# Patient Record
Sex: FEMALE | Race: ASIAN | NOT HISPANIC OR LATINO
[De-identification: names, ages, dates, MRNs, and addresses within clinical notes are randomized per-mention and may not be internally consistent; named-entity substitution may affect disease eponyms.]

---

## 2017-02-23 ENCOUNTER — LABORATORY RESULT (OUTPATIENT)
Age: 62
End: 2017-02-23

## 2017-04-06 ENCOUNTER — APPOINTMENT (OUTPATIENT)
Dept: VASCULAR SURGERY | Facility: CLINIC | Age: 62
End: 2017-04-06

## 2017-04-06 VITALS — SYSTOLIC BLOOD PRESSURE: 111 MMHG | DIASTOLIC BLOOD PRESSURE: 74 MMHG | HEART RATE: 77 BPM

## 2017-04-06 VITALS
BODY MASS INDEX: 27.6 KG/M2 | TEMPERATURE: 98 F | SYSTOLIC BLOOD PRESSURE: 98 MMHG | HEART RATE: 76 BPM | DIASTOLIC BLOOD PRESSURE: 66 MMHG | HEIGHT: 62 IN | WEIGHT: 150 LBS

## 2017-05-19 ENCOUNTER — OUTPATIENT (OUTPATIENT)
Dept: OUTPATIENT SERVICES | Facility: HOSPITAL | Age: 62
LOS: 1 days | End: 2017-05-19

## 2017-05-19 VITALS
DIASTOLIC BLOOD PRESSURE: 70 MMHG | HEIGHT: 61 IN | SYSTOLIC BLOOD PRESSURE: 104 MMHG | RESPIRATION RATE: 16 BRPM | WEIGHT: 149.91 LBS | TEMPERATURE: 98 F | HEART RATE: 76 BPM

## 2017-05-19 DIAGNOSIS — I10 ESSENTIAL (PRIMARY) HYPERTENSION: ICD-10-CM

## 2017-05-19 DIAGNOSIS — E03.9 HYPOTHYROIDISM, UNSPECIFIED: ICD-10-CM

## 2017-05-19 DIAGNOSIS — K76.0 FATTY (CHANGE OF) LIVER, NOT ELSEWHERE CLASSIFIED: ICD-10-CM

## 2017-05-19 DIAGNOSIS — Z98.891 HISTORY OF UTERINE SCAR FROM PREVIOUS SURGERY: Chronic | ICD-10-CM

## 2017-05-19 DIAGNOSIS — I72.8 ANEURYSM OF OTHER SPECIFIED ARTERIES: ICD-10-CM

## 2017-05-19 DIAGNOSIS — C34.90 MALIGNANT NEOPLASM OF UNSPECIFIED PART OF UNSPECIFIED BRONCHUS OR LUNG: Chronic | ICD-10-CM

## 2017-05-19 LAB
ALBUMIN SERPL ELPH-MCNC: 4.8 G/DL — SIGNIFICANT CHANGE UP (ref 3.3–5)
ALP SERPL-CCNC: 66 U/L — SIGNIFICANT CHANGE UP (ref 40–120)
ALT FLD-CCNC: 43 U/L — HIGH (ref 4–33)
APTT BLD: 30.7 SEC — SIGNIFICANT CHANGE UP (ref 27.5–37.4)
AST SERPL-CCNC: 21 U/L — SIGNIFICANT CHANGE UP (ref 4–32)
BILIRUB SERPL-MCNC: 0.5 MG/DL — SIGNIFICANT CHANGE UP (ref 0.2–1.2)
BLD GP AB SCN SERPL QL: NEGATIVE — SIGNIFICANT CHANGE UP
BUN SERPL-MCNC: 18 MG/DL — SIGNIFICANT CHANGE UP (ref 7–23)
CALCIUM SERPL-MCNC: 9.8 MG/DL — SIGNIFICANT CHANGE UP (ref 8.4–10.5)
CHLORIDE SERPL-SCNC: 101 MMOL/L — SIGNIFICANT CHANGE UP (ref 98–107)
CO2 SERPL-SCNC: 24 MMOL/L — SIGNIFICANT CHANGE UP (ref 22–31)
CREAT SERPL-MCNC: 0.76 MG/DL — SIGNIFICANT CHANGE UP (ref 0.5–1.3)
GLUCOSE SERPL-MCNC: 101 MG/DL — HIGH (ref 70–99)
HCT VFR BLD CALC: 38.8 % — SIGNIFICANT CHANGE UP (ref 34.5–45)
HGB BLD-MCNC: 13.2 G/DL — SIGNIFICANT CHANGE UP (ref 11.5–15.5)
INR BLD: 0.9 — SIGNIFICANT CHANGE UP (ref 0.88–1.17)
MCHC RBC-ENTMCNC: 31.4 PG — SIGNIFICANT CHANGE UP (ref 27–34)
MCHC RBC-ENTMCNC: 34 % — SIGNIFICANT CHANGE UP (ref 32–36)
MCV RBC AUTO: 92.4 FL — SIGNIFICANT CHANGE UP (ref 80–100)
PLATELET # BLD AUTO: 238 K/UL — SIGNIFICANT CHANGE UP (ref 150–400)
PMV BLD: 10.3 FL — SIGNIFICANT CHANGE UP (ref 7–13)
POTASSIUM SERPL-MCNC: 3.8 MMOL/L — SIGNIFICANT CHANGE UP (ref 3.5–5.3)
POTASSIUM SERPL-SCNC: 3.8 MMOL/L — SIGNIFICANT CHANGE UP (ref 3.5–5.3)
PROT SERPL-MCNC: 8.1 G/DL — SIGNIFICANT CHANGE UP (ref 6–8.3)
PROTHROM AB SERPL-ACNC: 10 SEC — SIGNIFICANT CHANGE UP (ref 9.8–13.1)
RBC # BLD: 4.2 M/UL — SIGNIFICANT CHANGE UP (ref 3.8–5.2)
RBC # FLD: 11.6 % — SIGNIFICANT CHANGE UP (ref 10.3–14.5)
RH IG SCN BLD-IMP: POSITIVE — SIGNIFICANT CHANGE UP
SODIUM SERPL-SCNC: 141 MMOL/L — SIGNIFICANT CHANGE UP (ref 135–145)
WBC # BLD: 10.35 K/UL — SIGNIFICANT CHANGE UP (ref 3.8–10.5)
WBC # FLD AUTO: 10.35 K/UL — SIGNIFICANT CHANGE UP (ref 3.8–10.5)

## 2017-05-19 RX ORDER — SODIUM CHLORIDE 9 MG/ML
1000 INJECTION, SOLUTION INTRAVENOUS
Qty: 0 | Refills: 0 | Status: DISCONTINUED | OUTPATIENT
Start: 2017-05-30 | End: 2017-05-31

## 2017-05-19 NOTE — H&P PST ADULT - PROBLEM SELECTOR PLAN 2
Patient to continue medication and to take in am of surgery  EKG in chart from PCP, medical clearance requested by surgeon, will obtain medical clearance

## 2017-05-19 NOTE — H&P PST ADULT - PMH
Adenocarcinoma of lung    Aneurysm of splenic artery    HLD (hyperlipidemia)    Hypertension    Hypothyroidism    Insomnia Adenocarcinoma of lung    Aneurysm of splenic artery    Fatty liver    HLD (hyperlipidemia)    Hypertension    Hypothyroidism    Insomnia

## 2017-05-19 NOTE — H&P PST ADULT - NSANTHOSAYNRD_GEN_A_CORE
No. JAVAD screening performed.  STOP BANG Legend: 0-2 = LOW Risk; 3-4 = INTERMEDIATE Risk; 5-8 = HIGH Risk

## 2017-05-19 NOTE — H&P PST ADULT - HISTORY OF PRESENT ILLNESS
62 yr old female with PMH Lung cancer s/p right lung wedge resection 62 yr old female with PMH Lung cancer s/p right lung resection right middle and right lower lobe h/o splenic artery aneurysm x 10 yrs patient stated the last CT scan shoed increase in size. Patient scheduled for splenic artery aneurysm angiogram and  embolization on 5/30/2017 62 yr old female with PMH Lung cancer s/p lung resection right middle and right lower lobe h/o splenic artery aneurysm x 10 yrs patient stated the last CT scan showed increase in size". Patient scheduled for splenic artery aneurysm angiogram and  embolization on 5/30/2017

## 2017-05-19 NOTE — H&P PST ADULT - NEGATIVE NEUROLOGICAL SYMPTOMS
no syncope/no paresthesias/no generalized seizures/no weakness/no transient paralysis/no focal seizures

## 2017-05-19 NOTE — H&P PST ADULT - FAMILY HISTORY
Father  Still living? No  Family history of liver cancer, Age at diagnosis: Age Unknown  Family history of stroke or transient ischemic attack in father, Age at diagnosis: Age Unknown

## 2017-05-19 NOTE — H&P PST ADULT - RS GEN PE MLT RESP DETAILS PC
airway patent/no wheezes/clear to auscultation bilaterally/no rhonchi/diminished breath sounds, R clear to auscultation bilaterally/no rhonchi/diminished breath sounds, R/no wheezes/airway patent/s/p lobectomy right

## 2017-05-19 NOTE — H&P PST ADULT - PROBLEM SELECTOR PLAN 1
62 yr old female scheduled for Splenic artery aneurysm angiogram and embolization on 5/30/2017  Pre op instruction given and patient verbalized understanding  3 Positive on STOP BANG questioner, JAVAD precaution recommended, OR booking notified

## 2017-05-30 ENCOUNTER — INPATIENT (INPATIENT)
Facility: HOSPITAL | Age: 62
LOS: 1 days | Discharge: HOME | End: 2017-06-01
Attending: SURGERY | Admitting: SURGERY
Payer: COMMERCIAL

## 2017-05-30 ENCOUNTER — APPOINTMENT (OUTPATIENT)
Dept: VASCULAR SURGERY | Facility: HOSPITAL | Age: 62
End: 2017-05-30

## 2017-05-30 VITALS
WEIGHT: 149.91 LBS | OXYGEN SATURATION: 98 % | HEIGHT: 61 IN | TEMPERATURE: 99 F | HEART RATE: 68 BPM | SYSTOLIC BLOOD PRESSURE: 108 MMHG | RESPIRATION RATE: 16 BRPM | DIASTOLIC BLOOD PRESSURE: 72 MMHG

## 2017-05-30 DIAGNOSIS — C34.90 MALIGNANT NEOPLASM OF UNSPECIFIED PART OF UNSPECIFIED BRONCHUS OR LUNG: Chronic | ICD-10-CM

## 2017-05-30 DIAGNOSIS — Z98.891 HISTORY OF UTERINE SCAR FROM PREVIOUS SURGERY: Chronic | ICD-10-CM

## 2017-05-30 DIAGNOSIS — I72.8 ANEURYSM OF OTHER SPECIFIED ARTERIES: ICD-10-CM

## 2017-05-30 LAB — RH IG SCN BLD-IMP: POSITIVE — SIGNIFICANT CHANGE UP

## 2017-05-30 PROCEDURE — 75625 CONTRAST EXAM ABDOMINL AORTA: CPT | Mod: 26,59

## 2017-05-30 PROCEDURE — 37242 VASC EMBOLIZE/OCCLUDE ARTERY: CPT

## 2017-05-30 RX ORDER — SIMVASTATIN 20 MG/1
20 TABLET, FILM COATED ORAL AT BEDTIME
Qty: 0 | Refills: 0 | Status: DISCONTINUED | OUTPATIENT
Start: 2017-05-30 | End: 2017-06-01

## 2017-05-30 RX ORDER — SULFACETAMIDE SODIUM 10 %
1 DROPS OPHTHALMIC (EYE)
Qty: 0 | Refills: 0 | COMMUNITY

## 2017-05-30 RX ORDER — ONDANSETRON 8 MG/1
4 TABLET, FILM COATED ORAL ONCE
Qty: 0 | Refills: 0 | Status: COMPLETED | OUTPATIENT
Start: 2017-05-30 | End: 2017-05-30

## 2017-05-30 RX ORDER — HEPARIN SODIUM 5000 [USP'U]/ML
5000 INJECTION INTRAVENOUS; SUBCUTANEOUS EVERY 8 HOURS
Qty: 0 | Refills: 0 | Status: DISCONTINUED | OUTPATIENT
Start: 2017-05-30 | End: 2017-06-01

## 2017-05-30 RX ORDER — HYDROMORPHONE HYDROCHLORIDE 2 MG/ML
1 INJECTION INTRAMUSCULAR; INTRAVENOUS; SUBCUTANEOUS EVERY 4 HOURS
Qty: 0 | Refills: 0 | Status: DISCONTINUED | OUTPATIENT
Start: 2017-05-30 | End: 2017-06-01

## 2017-05-30 RX ORDER — SIMVASTATIN 20 MG/1
1 TABLET, FILM COATED ORAL
Qty: 0 | Refills: 0 | COMMUNITY

## 2017-05-30 RX ORDER — ZOLPIDEM TARTRATE 10 MG/1
1 TABLET ORAL
Qty: 0 | Refills: 0 | COMMUNITY

## 2017-05-30 RX ORDER — HYDROMORPHONE HYDROCHLORIDE 2 MG/ML
0.25 INJECTION INTRAMUSCULAR; INTRAVENOUS; SUBCUTANEOUS
Qty: 0 | Refills: 0 | Status: DISCONTINUED | OUTPATIENT
Start: 2017-05-30 | End: 2017-05-30

## 2017-05-30 RX ORDER — AZELASTINE HCL 0.05 %
4 DROPS OPHTHALMIC (EYE)
Qty: 0 | Refills: 0 | COMMUNITY

## 2017-05-30 RX ORDER — OXYCODONE HYDROCHLORIDE 5 MG/1
10 TABLET ORAL EVERY 4 HOURS
Qty: 0 | Refills: 0 | Status: DISCONTINUED | OUTPATIENT
Start: 2017-05-30 | End: 2017-06-01

## 2017-05-30 RX ORDER — LEVOTHYROXINE SODIUM 125 MCG
1 TABLET ORAL
Qty: 0 | Refills: 0 | COMMUNITY

## 2017-05-30 RX ORDER — OXYCODONE HYDROCHLORIDE 5 MG/1
5 TABLET ORAL EVERY 4 HOURS
Qty: 0 | Refills: 0 | Status: DISCONTINUED | OUTPATIENT
Start: 2017-05-30 | End: 2017-06-01

## 2017-05-30 RX ORDER — HYDROMORPHONE HYDROCHLORIDE 2 MG/ML
0.5 INJECTION INTRAMUSCULAR; INTRAVENOUS; SUBCUTANEOUS
Qty: 0 | Refills: 0 | Status: DISCONTINUED | OUTPATIENT
Start: 2017-05-30 | End: 2017-05-30

## 2017-05-30 RX ORDER — LANOLIN ALCOHOL/MO/W.PET/CERES
3 CREAM (GRAM) TOPICAL AT BEDTIME
Qty: 0 | Refills: 0 | Status: DISCONTINUED | OUTPATIENT
Start: 2017-05-30 | End: 2017-06-01

## 2017-05-30 RX ADMIN — ONDANSETRON 4 MILLIGRAM(S): 8 TABLET, FILM COATED ORAL at 20:09

## 2017-05-30 RX ADMIN — ONDANSETRON 4 MILLIGRAM(S): 8 TABLET, FILM COATED ORAL at 22:20

## 2017-05-30 RX ADMIN — HYDROMORPHONE HYDROCHLORIDE 0.5 MILLIGRAM(S): 2 INJECTION INTRAMUSCULAR; INTRAVENOUS; SUBCUTANEOUS at 15:54

## 2017-05-30 RX ADMIN — HYDROMORPHONE HYDROCHLORIDE 0.5 MILLIGRAM(S): 2 INJECTION INTRAMUSCULAR; INTRAVENOUS; SUBCUTANEOUS at 16:05

## 2017-05-30 RX ADMIN — ONDANSETRON 4 MILLIGRAM(S): 8 TABLET, FILM COATED ORAL at 14:45

## 2017-05-30 RX ADMIN — HYDROMORPHONE HYDROCHLORIDE 0.25 MILLIGRAM(S): 2 INJECTION INTRAMUSCULAR; INTRAVENOUS; SUBCUTANEOUS at 15:43

## 2017-05-30 RX ADMIN — HYDROMORPHONE HYDROCHLORIDE 0.5 MILLIGRAM(S): 2 INJECTION INTRAMUSCULAR; INTRAVENOUS; SUBCUTANEOUS at 15:44

## 2017-05-30 RX ADMIN — OXYCODONE HYDROCHLORIDE 10 MILLIGRAM(S): 5 TABLET ORAL at 21:54

## 2017-05-30 RX ADMIN — HYDROMORPHONE HYDROCHLORIDE 0.5 MILLIGRAM(S): 2 INJECTION INTRAMUSCULAR; INTRAVENOUS; SUBCUTANEOUS at 17:22

## 2017-05-30 RX ADMIN — HYDROMORPHONE HYDROCHLORIDE 1 MILLIGRAM(S): 2 INJECTION INTRAMUSCULAR; INTRAVENOUS; SUBCUTANEOUS at 23:58

## 2017-05-30 RX ADMIN — HYDROMORPHONE HYDROCHLORIDE 0.25 MILLIGRAM(S): 2 INJECTION INTRAMUSCULAR; INTRAVENOUS; SUBCUTANEOUS at 15:00

## 2017-05-30 RX ADMIN — OXYCODONE HYDROCHLORIDE 10 MILLIGRAM(S): 5 TABLET ORAL at 21:08

## 2017-05-30 RX ADMIN — HYDROMORPHONE HYDROCHLORIDE 0.5 MILLIGRAM(S): 2 INJECTION INTRAMUSCULAR; INTRAVENOUS; SUBCUTANEOUS at 17:35

## 2017-05-30 RX ADMIN — Medication 3 MILLIGRAM(S): at 21:08

## 2017-05-30 RX ADMIN — SIMVASTATIN 20 MILLIGRAM(S): 20 TABLET, FILM COATED ORAL at 21:08

## 2017-05-30 RX ADMIN — SODIUM CHLORIDE 30 MILLILITER(S): 9 INJECTION, SOLUTION INTRAVENOUS at 15:28

## 2017-05-30 RX ADMIN — SODIUM CHLORIDE 30 MILLILITER(S): 9 INJECTION, SOLUTION INTRAVENOUS at 20:26

## 2017-05-30 RX ADMIN — HYDROMORPHONE HYDROCHLORIDE 0.25 MILLIGRAM(S): 2 INJECTION INTRAMUSCULAR; INTRAVENOUS; SUBCUTANEOUS at 14:45

## 2017-05-30 RX ADMIN — HYDROMORPHONE HYDROCHLORIDE 0.25 MILLIGRAM(S): 2 INJECTION INTRAMUSCULAR; INTRAVENOUS; SUBCUTANEOUS at 15:26

## 2017-05-30 RX ADMIN — HEPARIN SODIUM 5000 UNIT(S): 5000 INJECTION INTRAVENOUS; SUBCUTANEOUS at 21:08

## 2017-05-30 RX ADMIN — HYDROMORPHONE HYDROCHLORIDE 0.5 MILLIGRAM(S): 2 INJECTION INTRAMUSCULAR; INTRAVENOUS; SUBCUTANEOUS at 15:55

## 2017-05-30 NOTE — ASU PATIENT PROFILE, ADULT - PMH
Adenocarcinoma of lung    Aneurysm of splenic artery    Fatty liver    HLD (hyperlipidemia)    Hypertension    Hypothyroidism    Insomnia

## 2017-05-30 NOTE — CHART NOTE - NSCHARTNOTEFT_GEN_A_CORE
Vascular surgery     SUBJECTIVE: pain controlled, no nausea/vomiting/headache/chest pain/shortness of breath. pt c/o of dizziness. Pt was nauseated earlier, but now denies nausea after receiving a zofran dose.      OBJECTIVE:  PHYSICAL EXAM:  GA: NAD  Abdomen:  -  soft, non-distended, non-tender  - Right Groin: Dressing is clean/dry/intact     Vitals/Labs:  Vital Signs Last 24 Hrs  T(C): 36.7, Max: 37 (05-30 @ 10:46)  T(F): 98.1, Max: 98.6 (05-30 @ 10:46)  HR: 78 (68 - 82)  BP: 107/62 (107/62 - 135/74)  BP(mean): --  RR: 12 (12 - 25)  SpO2: 100% (98% - 100%)    ASSESSMENT/PLAN: SOON CHO 62y Female s/p  splenic artery embolization   - Diet: reg  - Pain control   - Input and outputs   - DVT ppx  - OOB/incentive spirometry four hours post-operatively   MEDICATIONS  (STANDING):  lactated ringers. 1000milliLiter(s) IV Continuous <Continuous>  enalapril 20milliGRAM(s) Oral daily  simvastatin 20milliGRAM(s) Oral at bedtime  heparin  Injectable 5000Unit(s) SubCutaneous every 8 hours    MEDICATIONS  (PRN):  HYDROmorphone  Injectable 0.25milliGRAM(s) IV Push every 10 minutes PRN Moderate Pain (4 - 6)  HYDROmorphone  Injectable 0.5milliGRAM(s) IV Push every 10 minutes PRN Severe Pain (7 - 10)    C team Surgery   Pager: [phone]   Michael Mathias M.D. (PGY-1)

## 2017-05-30 NOTE — ASU PREOP CHECKLIST - BLOOD AVAILABLE
CT spec & slip for 2upc will be sent w/iv start yes/CT spec & slip for 2upc will be sent w/iv start CT spec & slip for 2upc sent w/iv start/2upc available per bloodbank list/yes

## 2017-05-31 ENCOUNTER — TRANSCRIPTION ENCOUNTER (OUTPATIENT)
Age: 62
End: 2017-05-31

## 2017-05-31 LAB
HCT VFR BLD CALC: 31.3 % — LOW (ref 34.5–45)
HCT VFR BLD CALC: 32.1 % — LOW (ref 34.5–45)
HGB BLD-MCNC: 10.7 G/DL — LOW (ref 11.5–15.5)
HGB BLD-MCNC: 10.9 G/DL — LOW (ref 11.5–15.5)
MCHC RBC-ENTMCNC: 31 PG — SIGNIFICANT CHANGE UP (ref 27–34)
MCHC RBC-ENTMCNC: 31.5 PG — SIGNIFICANT CHANGE UP (ref 27–34)
MCHC RBC-ENTMCNC: 34 % — SIGNIFICANT CHANGE UP (ref 32–36)
MCHC RBC-ENTMCNC: 34.2 % — SIGNIFICANT CHANGE UP (ref 32–36)
MCV RBC AUTO: 91.2 FL — SIGNIFICANT CHANGE UP (ref 80–100)
MCV RBC AUTO: 92.1 FL — SIGNIFICANT CHANGE UP (ref 80–100)
PLATELET # BLD AUTO: 129 K/UL — LOW (ref 150–400)
PLATELET # BLD AUTO: 154 K/UL — SIGNIFICANT CHANGE UP (ref 150–400)
PMV BLD: 10.3 FL — SIGNIFICANT CHANGE UP (ref 7–13)
PMV BLD: 10.3 FL — SIGNIFICANT CHANGE UP (ref 7–13)
RBC # BLD: 3.4 M/UL — LOW (ref 3.8–5.2)
RBC # BLD: 3.52 M/UL — LOW (ref 3.8–5.2)
RBC # FLD: 11.8 % — SIGNIFICANT CHANGE UP (ref 10.3–14.5)
RBC # FLD: 11.8 % — SIGNIFICANT CHANGE UP (ref 10.3–14.5)
WBC # BLD: 12.73 K/UL — HIGH (ref 3.8–10.5)
WBC # BLD: 13.76 K/UL — HIGH (ref 3.8–10.5)
WBC # FLD AUTO: 12.73 K/UL — HIGH (ref 3.8–10.5)
WBC # FLD AUTO: 13.76 K/UL — HIGH (ref 3.8–10.5)

## 2017-05-31 PROCEDURE — 99232 SBSQ HOSP IP/OBS MODERATE 35: CPT | Mod: GC

## 2017-05-31 RX ORDER — ONDANSETRON 8 MG/1
4 TABLET, FILM COATED ORAL EVERY 6 HOURS
Qty: 0 | Refills: 0 | Status: DISCONTINUED | OUTPATIENT
Start: 2017-05-31 | End: 2017-06-01

## 2017-05-31 RX ORDER — LEVOTHYROXINE SODIUM 125 MCG
100 TABLET ORAL DAILY
Qty: 0 | Refills: 0 | Status: DISCONTINUED | OUTPATIENT
Start: 2017-05-31 | End: 2017-06-01

## 2017-05-31 RX ORDER — DEXTROSE MONOHYDRATE, SODIUM CHLORIDE, AND POTASSIUM CHLORIDE 50; .745; 4.5 G/1000ML; G/1000ML; G/1000ML
1000 INJECTION, SOLUTION INTRAVENOUS
Qty: 0 | Refills: 0 | Status: DISCONTINUED | OUTPATIENT
Start: 2017-05-31 | End: 2017-06-01

## 2017-05-31 RX ADMIN — Medication 100 MICROGRAM(S): at 07:10

## 2017-05-31 RX ADMIN — ONDANSETRON 4 MILLIGRAM(S): 8 TABLET, FILM COATED ORAL at 07:10

## 2017-05-31 RX ADMIN — Medication 20 MILLIGRAM(S): at 05:09

## 2017-05-31 RX ADMIN — HEPARIN SODIUM 5000 UNIT(S): 5000 INJECTION INTRAVENOUS; SUBCUTANEOUS at 05:10

## 2017-05-31 RX ADMIN — HEPARIN SODIUM 5000 UNIT(S): 5000 INJECTION INTRAVENOUS; SUBCUTANEOUS at 22:31

## 2017-05-31 RX ADMIN — HYDROMORPHONE HYDROCHLORIDE 1 MILLIGRAM(S): 2 INJECTION INTRAMUSCULAR; INTRAVENOUS; SUBCUTANEOUS at 00:12

## 2017-05-31 RX ADMIN — Medication 3 MILLIGRAM(S): at 22:30

## 2017-05-31 RX ADMIN — OXYCODONE HYDROCHLORIDE 10 MILLIGRAM(S): 5 TABLET ORAL at 07:30

## 2017-05-31 RX ADMIN — SODIUM CHLORIDE 30 MILLILITER(S): 9 INJECTION, SOLUTION INTRAVENOUS at 12:25

## 2017-05-31 RX ADMIN — HEPARIN SODIUM 5000 UNIT(S): 5000 INJECTION INTRAVENOUS; SUBCUTANEOUS at 15:56

## 2017-05-31 RX ADMIN — OXYCODONE HYDROCHLORIDE 10 MILLIGRAM(S): 5 TABLET ORAL at 19:42

## 2017-05-31 RX ADMIN — DEXTROSE MONOHYDRATE, SODIUM CHLORIDE, AND POTASSIUM CHLORIDE 50 MILLILITER(S): 50; .745; 4.5 INJECTION, SOLUTION INTRAVENOUS at 22:32

## 2017-05-31 RX ADMIN — DEXTROSE MONOHYDRATE, SODIUM CHLORIDE, AND POTASSIUM CHLORIDE 50 MILLILITER(S): 50; .745; 4.5 INJECTION, SOLUTION INTRAVENOUS at 18:12

## 2017-05-31 RX ADMIN — OXYCODONE HYDROCHLORIDE 10 MILLIGRAM(S): 5 TABLET ORAL at 02:55

## 2017-05-31 RX ADMIN — SIMVASTATIN 20 MILLIGRAM(S): 20 TABLET, FILM COATED ORAL at 22:30

## 2017-05-31 RX ADMIN — OXYCODONE HYDROCHLORIDE 10 MILLIGRAM(S): 5 TABLET ORAL at 02:20

## 2017-05-31 RX ADMIN — OXYCODONE HYDROCHLORIDE 10 MILLIGRAM(S): 5 TABLET ORAL at 06:48

## 2017-05-31 NOTE — PROGRESS NOTE ADULT - SUBJECTIVE AND OBJECTIVE BOX
SURGERY DAILY PROGRESS NOTE:     Postoperative day: 1    Subjective: Patient complained of significant abdominal pain overnight requiring breakthrough dilaudid. Nausea overnight requiring zofran x 2.     OBJECTIVE:  PHYSICAL EXAM:  GA: NAD  Abdomen:  -  soft, non-distended, non-tender  - Right Groin: Dressing is clean/dry/intact     Vital Signs Last 24 Hrs  T(C): 36.4, Max: 37 (05-30 @ 10:46)  T(F): 97.6, Max: 98.6 (05-30 @ 10:46)  HR: 90 (68 - 95)  BP: 112/61 (102/58 - 135/74)  BP(mean): --  RR: 17 (12 - 25)  SpO2: 96% (96% - 100%)    I&O's Detail    I & Os for current day (as of 31 May 2017 04:29)  =============================================  IN:    lactated ringers.: 300 ml    Total IN: 300 ml  ---------------------------------------------  OUT:    Voided: 450 ml    Total OUT: 450 ml  ---------------------------------------------  Total NET: -150 ml SURGERY DAILY PROGRESS NOTE:     Postoperative day: 1    Subjective: Patient complained of significant abdominal pain overnight requiring breakthrough dilaudid. Nausea overnight requiring zofran x 2.    OBJECTIVE:  PHYSICAL EXAM:  GA: NAD  Abdomen:  -  soft, non-distended, non-tender  - Right Groin: Dressing is clean/dry/intact, no hematoma     Vital Signs Last 24 Hrs  T(C): 36.7, Max: 37 (05-30 @ 10:46)  T(F): 98, Max: 98.6 (05-30 @ 10:46)  HR: 95 (68 - 95)  BP: 106/66 (102/58 - 135/74)  BP(mean): --  RR: 16 (12 - 25)  SpO2: 96% (96% - 100%)    I&O's Detail    I & Os for current day (as of 31 May 2017 07:58)  =============================================  IN:    lactated ringers.: 420 ml    Total IN: 420 ml  ---------------------------------------------  OUT:    Voided: 450 ml    Total OUT: 450 ml  ---------------------------------------------  Total NET: -30 ml                            10.9   12.73 )-----------( 154      ( 31 May 2017 06:00 )             32.1               CAPILLARY BLOOD GLUCOSE

## 2017-05-31 NOTE — PROGRESS NOTE ADULT - SUBJECTIVE AND OBJECTIVE BOX
Vascular Surgery PA Progress Note     Subjective/Objective: Patient Willie Rodriguez is a 62y female POD1 s/p coil emobolization of splenic artery aneurysm. She reports abdominal pain overnight which has persisted this morning. She rates her pain as a 4-5/10. She reports feeling nauseated with a decreased appetite. She has not had a BM yet. Her abdomen is soft, nondistended, nontender. Her right groin incision is c/d/i with no evidence of hematoma.     T(C): 36.7, Max: 37 (05-30 @ 10:46)  HR: 95 (68 - 95)  BP: 106/66 (102/58 - 135/74)  RR: 16 (12 - 25)  SpO2: 96% (96% - 100%)      CBC Full  -  ( 31 May 2017 06:00 )  WBC Count : 12.73 K/uL  Hemoglobin : 10.9 g/dL  Hematocrit : 32.1 %  Platelet Count - Automated : 154 K/uL  Mean Cell Volume : 91.2 fL  Mean Cell Hemoglobin : 31.0 pg  Mean Cell Hemoglobin Concentration : 34.0 %

## 2017-05-31 NOTE — PROGRESS NOTE ADULT - ASSESSMENT
Plan to continue to monitor Hgb and Hct. Pain control. Plan to continue to monitor Hgb and Hct. Pain control. Keep dressing on until tomorrow.

## 2017-05-31 NOTE — PROGRESS NOTE ADULT - ATTENDING COMMENTS
Patient seen and examined with resident. Nauseated and vomiting earlier today. Tolerated some PO. Currently pain free. Will continue to monitor. Possible discharge tomorrow pending improvement in pain and adequate PO intake.

## 2017-05-31 NOTE — PROGRESS NOTE ADULT - ASSESSMENT
62y Female s/p  splenic artery embolization   - Diet: reg  - Pain control   - Input and outputs   - DVT ppx  - OOB/incentive spirometry     Page [phone] for questions 62y Female s/p  splenic artery embolization   - Diet: reg  - Pain control   - Input and outputs   - DVT ppx  - OOB/incentive spirometry     MEDICATIONS  (STANDING):  lactated ringers. 1000milliLiter(s) IV Continuous <Continuous>  enalapril 20milliGRAM(s) Oral daily  simvastatin 20milliGRAM(s) Oral at bedtime  heparin  Injectable 5000Unit(s) SubCutaneous every 8 hours  levothyroxine 100MICROGram(s) Oral daily    MEDICATIONS  (PRN):  oxyCODONE IR 5milliGRAM(s) Oral every 4 hours PRN Moderate Pain (4 - 6)  oxyCODONE IR 10milliGRAM(s) Oral every 4 hours PRN Severe Pain (7 - 10)  melatonin 3milliGRAM(s) Oral at bedtime PRN Insomnia  HYDROmorphone  Injectable 1milliGRAM(s) IV Push every 4 hours PRN breakthrough pain  ondansetron Injectable 4milliGRAM(s) IV Push every 6 hours PRN Nausea and/or Vomiting    C team Surgery   Pager: [phone]   Michael Mathias M.D. (PGY-1)

## 2017-06-01 VITALS
RESPIRATION RATE: 8 BRPM | OXYGEN SATURATION: 99 % | HEART RATE: 86 BPM | DIASTOLIC BLOOD PRESSURE: 68 MMHG | TEMPERATURE: 98 F | SYSTOLIC BLOOD PRESSURE: 113 MMHG

## 2017-06-01 LAB
BUN SERPL-MCNC: 16 MG/DL — SIGNIFICANT CHANGE UP (ref 7–23)
CALCIUM SERPL-MCNC: 7.9 MG/DL — LOW (ref 8.4–10.5)
CHLORIDE SERPL-SCNC: 97 MMOL/L — LOW (ref 98–107)
CO2 SERPL-SCNC: 23 MMOL/L — SIGNIFICANT CHANGE UP (ref 22–31)
CREAT SERPL-MCNC: 0.74 MG/DL — SIGNIFICANT CHANGE UP (ref 0.5–1.3)
GLUCOSE SERPL-MCNC: 133 MG/DL — HIGH (ref 70–99)
HCT VFR BLD CALC: 29.7 % — LOW (ref 34.5–45)
HGB BLD-MCNC: 9.9 G/DL — LOW (ref 11.5–15.5)
MAGNESIUM SERPL-MCNC: 2 MG/DL — SIGNIFICANT CHANGE UP (ref 1.6–2.6)
MCHC RBC-ENTMCNC: 31.1 PG — SIGNIFICANT CHANGE UP (ref 27–34)
MCHC RBC-ENTMCNC: 33.3 % — SIGNIFICANT CHANGE UP (ref 32–36)
MCV RBC AUTO: 93.4 FL — SIGNIFICANT CHANGE UP (ref 80–100)
PHOSPHATE SERPL-MCNC: 2 MG/DL — LOW (ref 2.5–4.5)
PLATELET # BLD AUTO: 114 K/UL — LOW (ref 150–400)
PMV BLD: 10.3 FL — SIGNIFICANT CHANGE UP (ref 7–13)
POTASSIUM SERPL-MCNC: 4.1 MMOL/L — SIGNIFICANT CHANGE UP (ref 3.5–5.3)
POTASSIUM SERPL-SCNC: 4.1 MMOL/L — SIGNIFICANT CHANGE UP (ref 3.5–5.3)
RBC # BLD: 3.18 M/UL — LOW (ref 3.8–5.2)
RBC # FLD: 12 % — SIGNIFICANT CHANGE UP (ref 10.3–14.5)
SODIUM SERPL-SCNC: 134 MMOL/L — LOW (ref 135–145)
WBC # BLD: 10.75 K/UL — HIGH (ref 3.8–10.5)
WBC # FLD AUTO: 10.75 K/UL — HIGH (ref 3.8–10.5)

## 2017-06-01 PROCEDURE — 74176 CT ABD & PELVIS W/O CONTRAST: CPT | Mod: 26

## 2017-06-01 RX ORDER — OXYCODONE HYDROCHLORIDE 5 MG/1
1 TABLET ORAL
Qty: 12 | Refills: 0 | OUTPATIENT
Start: 2017-06-01 | End: 2017-06-04

## 2017-06-01 RX ORDER — OXYCODONE HYDROCHLORIDE 5 MG/1
1 TABLET ORAL
Qty: 0 | Refills: 0 | COMMUNITY
Start: 2017-06-01

## 2017-06-01 RX ORDER — SODIUM,POTASSIUM PHOSPHATES 278-250MG
1 POWDER IN PACKET (EA) ORAL ONCE
Qty: 0 | Refills: 0 | Status: COMPLETED | OUTPATIENT
Start: 2017-06-01 | End: 2017-06-01

## 2017-06-01 RX ORDER — SODIUM,POTASSIUM PHOSPHATES 278-250MG
1 POWDER IN PACKET (EA) ORAL
Qty: 0 | Refills: 0 | Status: DISCONTINUED | OUTPATIENT
Start: 2017-06-01 | End: 2017-06-01

## 2017-06-01 RX ADMIN — OXYCODONE HYDROCHLORIDE 10 MILLIGRAM(S): 5 TABLET ORAL at 03:19

## 2017-06-01 RX ADMIN — OXYCODONE HYDROCHLORIDE 5 MILLIGRAM(S): 5 TABLET ORAL at 14:49

## 2017-06-01 RX ADMIN — OXYCODONE HYDROCHLORIDE 10 MILLIGRAM(S): 5 TABLET ORAL at 04:19

## 2017-06-01 RX ADMIN — OXYCODONE HYDROCHLORIDE 10 MILLIGRAM(S): 5 TABLET ORAL at 08:15

## 2017-06-01 RX ADMIN — OXYCODONE HYDROCHLORIDE 10 MILLIGRAM(S): 5 TABLET ORAL at 09:00

## 2017-06-01 RX ADMIN — Medication 20 MILLIGRAM(S): at 05:38

## 2017-06-01 RX ADMIN — OXYCODONE HYDROCHLORIDE 5 MILLIGRAM(S): 5 TABLET ORAL at 14:08

## 2017-06-01 RX ADMIN — Medication 1 TABLET(S): at 14:10

## 2017-06-01 RX ADMIN — HEPARIN SODIUM 5000 UNIT(S): 5000 INJECTION INTRAVENOUS; SUBCUTANEOUS at 05:38

## 2017-06-01 RX ADMIN — HEPARIN SODIUM 5000 UNIT(S): 5000 INJECTION INTRAVENOUS; SUBCUTANEOUS at 14:12

## 2017-06-01 RX ADMIN — Medication 100 MICROGRAM(S): at 05:38

## 2017-06-01 NOTE — DISCHARGE NOTE ADULT - HOSPITAL COURSE
Pt presented for scheduled surgery, underwent coil embolization of splenic artery aneurysm. Vital signs monitored and remained stable, voided appropriately, H/H trended, pain controlled, patient ambulated without difficulty. Stable for discharge home with f/u with Dr. Carroll.

## 2017-06-01 NOTE — PROGRESS NOTE ADULT - SUBJECTIVE AND OBJECTIVE BOX
Patient's nausea improved   on exam: soft abdomen, pain on deep palpation on LUQ not in any distress  Not tachycardic or hypotensive hct noted possibly dilutional.  Pain likely form splenic infarct 2/2 to coil embolization of splenic aneurysm  Will check non contrast ct to asses for hematoma.   If ok patient will be able to be d/c

## 2017-06-01 NOTE — PROGRESS NOTE ADULT - ASSESSMENT
62y Female s/p  splenic artery embolization   - Diet: reg  - Pain control   - Input and outputs   - DVT ppx  - OOB/incentive spirometry   - d/c home today    00045  C team surgery 62y Female s/p splenic artery embolization   - Diet: reg  - Pain control   - Input and outputs   - DVT ppx  - OOB/incentive spirometry   - d/c home today    00045  C team surgery  Jenelle Christian, PAC

## 2017-06-01 NOTE — DISCHARGE NOTE ADULT - PLAN OF CARE
s/p embolization Please follow up with Dr. Carroll in 1-2 weeks. Call his office at the number listed below.  You may take Tylenol or Oxycodone as directed as needed for pain control.  If you experience bleeding at surgical site, dizziness, shortness of breath, chest pain, pain not controlled by pain medication, fevers, or any other new, concerning signs or symptoms, please call Dr. Carroll's office and return to the ER at [facility].

## 2017-06-01 NOTE — DISCHARGE NOTE ADULT - CARE PROVIDER_API CALL
Colt Carroll), Surgery; Vascular Surgery  [address] [place] [address]  Phone: [phone]  Fax: [phone]

## 2017-06-01 NOTE — DISCHARGE NOTE ADULT - CARE PLAN
Principal Discharge DX:	Aneurysm of splenic artery  Goal:	s/p embolization  Instructions for follow-up, activity and diet:	Please follow up with Dr. Carroll in 1-2 weeks. Call his office at the number listed below.  You may take Tylenol or Oxycodone as directed as needed for pain control.  If you experience bleeding at surgical site, dizziness, shortness of breath, chest pain, pain not controlled by pain medication, fevers, or any other new, concerning signs or symptoms, please call Dr. Carroll's office and return to the ER at [facility]. Principal Discharge DX:	Aneurysm of splenic artery  Goal:	s/p embolization  Instructions for follow-up, activity and diet:	Please follow up with Dr. Carroll in 1-2 weeks. Call his office at the number listed below.  You may take Tylenol or Oxycodone as directed as needed for pain control.  If you experience bleeding at surgical site, dizziness, shortness of breath, chest pain, pain not controlled by pain medication, fevers, or any other new, concerning signs or symptoms, please call Dr. Carroll's office and return to the ER at [facility]. Principal Discharge DX:	Aneurysm of splenic artery  Goal:	s/p embolization  Instructions for follow-up, activity and diet:	Please follow up with Dr. Carroll in 1-2 weeks. Call his office at the number listed below.  You may take Tylenol or Oxycodone as directed as needed for pain control.  If you experience bleeding at surgical site, dizziness, shortness of breath, chest pain, pain not controlled by pain medication, fevers, or any other new, concerning signs or symptoms, please call Dr. Carroll's office and return to the ER at [facility]. Principal Discharge DX:	Aneurysm of splenic artery  Goal:	s/p embolization  Instructions for follow-up, activity and diet:	Please follow up with Dr. Carroll in 1-2 weeks. Call his office at the number listed below.  You may take Tylenol or Oxycodone as directed as needed for pain control.  If you experience bleeding at surgical site, dizziness, shortness of breath, chest pain, pain not controlled by pain medication, fevers, or any other new, concerning signs or symptoms, please call Dr. Carroll's office and return to the ER at [facility].

## 2017-06-01 NOTE — PROGRESS NOTE ADULT - SUBJECTIVE AND OBJECTIVE BOX
SURGERY DAILY PROGRESS NOTE:     Postoperative day: 2    Subjective: Patient tolerated her dinner without nausea or vomiting and reports feeling improved. Patient amenable to discharge home today.     OBJECTIVE:  PHYSICAL EXAM:  GA: NAD  Abdomen:  -  soft, non-distended, non-tender  - Right Groin: Dressing is clean/dry/intact, no hematoma SURGERY DAILY PROGRESS NOTE:     Postoperative day: 2    Subjective: Patient tolerated her dinner without nausea or vomiting and reports feeling improved. Patient amenable to discharge home today.     OBJECTIVE:  PHYSICAL EXAM:  GA: NAD  Abdomen:  -  soft, non-distended, non-tender  - Right Groin: Dressing is clean/dry/intact, no hematoma     Vital Signs Last 24 Hrs  T(C): 36.7, Max: 37.1 (05-31 @ 22:30)  T(F): 98, Max: 98.8 (05-31 @ 22:30)  HR: 89 (70 - 90)  BP: 120/62 (99/59 - 120/62)  BP(mean): --  RR: 16 (16 - 18)  SpO2: 95% (95% - 100%)    I&O's Detail    I & Os for current day (as of 01 Jun 2017 07:38)  =============================================  IN:    dextrose 5% + sodium chloride 0.45% with potassium chloride 20 mEq/L: 750 ml    lactated ringers.: 210 ml    Total IN: 960 ml  ---------------------------------------------  OUT:    Voided: 1950 ml    Total OUT: 1950 ml  ---------------------------------------------  Total NET: -990 ml                            9.9    10.75 )-----------( 114      ( 01 Jun 2017 06:00 )             29.7       06-01    134<L>  |  97<L>  |  16  ----------------------------<  133<H>  4.1   |  23  |  0.74    Ca    7.9<L>      01 Jun 2017 06:00  Phos  2.0     06-01  Mg     2.0     06-01        CAPILLARY BLOOD GLUCOSE

## 2017-06-01 NOTE — DISCHARGE NOTE ADULT - MEDICATION SUMMARY - MEDICATIONS TO TAKE
I will START or STAY ON the medications listed below when I get home from the hospital:    oxyCODONE 10 mg oral tablet  -- 1 tab(s) by mouth every 4 hours, As needed, Severe Pain (7 - 10)  -- Indication: For pain    oxyCODONE 5 mg oral tablet  -- 1-2 tab(s) by mouth every 6 hours, As Needed, pain MDD:8 tabs  -- Indication: For pain    enalapril 20 mg oral tablet  -- 1 tab(s) by mouth once a day in am  -- Indication: For high blood pressure    simvastatin 20 mg oral tablet  -- 1 tab(s) by mouth once a day (at bedtime)  -- Indication: For high cholesterol    zolpidem 5 mg oral tablet  -- 1 tab(s) by mouth once a day (at bedtime), As Needed  -- Indication: For sleep aid    azelastine 0.05% ophthalmic solution  -- 4 drop(s) to each affected eye 3 times a day  -- Indication: For eye medicine    sulfacetamide sodium 10% ophthalmic ointment  -- 1  application to each affected eye 3 times a day  -- Indication: For eye medicine    levothyroxine 100 mcg (0.1 mg) oral capsule  -- 1 cap(s) by mouth once a day in am  -- Indication: For hypothyroidism I will START or STAY ON the medications listed below when I get home from the hospital:    oxyCODONE 10 mg oral tablet  -- 1 tab(s) by mouth every 4 hours, As needed, Severe Pain (7 - 10)  -- Indication: For pain    oxyCODONE 5 mg oral tablet  -- 1-2 tab(s) by mouth every 6 hours, As Needed, pain MDD:8 tabs  -- Indication: For pain    enalapril 20 mg oral tablet  -- 1 tab(s) by mouth once a day in am  -- Indication: For high blood pressure    simvastatin 20 mg oral tablet  -- 1 tab(s) by mouth once a day (at bedtime)  -- Indication: For high cholesterol    zolpidem 5 mg oral tablet  -- 1 tab(s) by mouth once a day (at bedtime), As Needed  -- Indication: For Anxiety    azelastine 0.05% ophthalmic solution  -- 4 drop(s) to each affected eye 3 times a day  -- Indication: For eye drops    sulfacetamide sodium 10% ophthalmic ointment  -- 1  application to each affected eye 3 times a day  -- Indication: For eye drops    levothyroxine 100 mcg (0.1 mg) oral capsule  -- 1 cap(s) by mouth once a day in am  -- Indication: For hypothyroid

## 2017-06-01 NOTE — DISCHARGE NOTE ADULT - PATIENT PORTAL LINK FT
“You can access the [facility] Patient Portal, offered by [facility], by registering with the following website: [email or web address]”

## 2017-06-01 NOTE — DISCHARGE NOTE ADULT - NS AS ACTIVITY OBS
while taking pain medications/Do not drive or operate machinery/No Heavy lifting/straining/Do not make important decisions

## 2017-06-12 ENCOUNTER — APPOINTMENT (OUTPATIENT)
Dept: VASCULAR SURGERY | Facility: CLINIC | Age: 62
End: 2017-06-12

## 2017-06-12 VITALS
TEMPERATURE: 98.4 F | HEART RATE: 69 BPM | SYSTOLIC BLOOD PRESSURE: 128 MMHG | BODY MASS INDEX: 27.6 KG/M2 | RESPIRATION RATE: 18 BRPM | DIASTOLIC BLOOD PRESSURE: 85 MMHG | HEIGHT: 62 IN | WEIGHT: 150 LBS

## 2017-07-10 ENCOUNTER — APPOINTMENT (OUTPATIENT)
Dept: CT IMAGING | Facility: IMAGING CENTER | Age: 62
End: 2017-07-10

## 2017-07-10 ENCOUNTER — OUTPATIENT (OUTPATIENT)
Dept: OUTPATIENT SERVICES | Facility: HOSPITAL | Age: 62
LOS: 1 days | End: 2017-07-10
Payer: COMMERCIAL

## 2017-07-10 DIAGNOSIS — Z98.891 HISTORY OF UTERINE SCAR FROM PREVIOUS SURGERY: Chronic | ICD-10-CM

## 2017-07-10 DIAGNOSIS — C34.90 MALIGNANT NEOPLASM OF UNSPECIFIED PART OF UNSPECIFIED BRONCHUS OR LUNG: Chronic | ICD-10-CM

## 2017-07-10 DIAGNOSIS — Z00.8 ENCOUNTER FOR OTHER GENERAL EXAMINATION: ICD-10-CM

## 2017-07-10 PROCEDURE — 74174 CTA ABD&PLVS W/CONTRAST: CPT

## 2017-07-13 ENCOUNTER — APPOINTMENT (OUTPATIENT)
Dept: VASCULAR SURGERY | Facility: CLINIC | Age: 62
End: 2017-07-13

## 2017-07-13 VITALS
WEIGHT: 150 LBS | SYSTOLIC BLOOD PRESSURE: 119 MMHG | HEIGHT: 62 IN | TEMPERATURE: 98.1 F | HEART RATE: 96 BPM | DIASTOLIC BLOOD PRESSURE: 81 MMHG | BODY MASS INDEX: 27.6 KG/M2

## 2017-09-28 ENCOUNTER — TRANSCRIPTION ENCOUNTER (OUTPATIENT)
Age: 62
End: 2017-09-28

## 2017-10-05 ENCOUNTER — APPOINTMENT (OUTPATIENT)
Dept: VASCULAR SURGERY | Facility: CLINIC | Age: 62
End: 2017-10-05
Payer: COMMERCIAL

## 2017-10-05 VITALS
SYSTOLIC BLOOD PRESSURE: 122 MMHG | BODY MASS INDEX: 26.68 KG/M2 | HEART RATE: 63 BPM | DIASTOLIC BLOOD PRESSURE: 83 MMHG | WEIGHT: 145 LBS | HEIGHT: 62 IN | TEMPERATURE: 98.3 F

## 2017-10-05 PROCEDURE — 99214 OFFICE O/P EST MOD 30 MIN: CPT | Mod: 25

## 2017-10-05 PROCEDURE — 93976 VASCULAR STUDY: CPT

## 2017-10-11 RX ORDER — SULFACETAMIDE SODIUM 100 MG/ML
10 SOLUTION OPHTHALMIC
Qty: 15 | Refills: 0 | Status: ACTIVE | COMMUNITY
Start: 2017-05-13

## 2017-10-11 RX ORDER — AZELASTINE HYDROCHLORIDE 0.5 MG/ML
0.05 SOLUTION/ DROPS OPHTHALMIC
Qty: 6 | Refills: 0 | Status: ACTIVE | COMMUNITY
Start: 2017-05-13

## 2017-10-11 RX ORDER — LEVOTHYROXINE SODIUM 0.07 MG/1
75 TABLET ORAL
Qty: 90 | Refills: 0 | Status: ACTIVE | COMMUNITY
Start: 2017-05-20

## 2017-10-11 RX ORDER — ZOLPIDEM TARTRATE 10 MG/1
10 TABLET ORAL
Qty: 30 | Refills: 0 | Status: ACTIVE | COMMUNITY
Start: 2016-12-16

## 2017-10-17 ENCOUNTER — APPOINTMENT (OUTPATIENT)
Dept: THORACIC SURGERY | Facility: CLINIC | Age: 62
End: 2017-10-17
Payer: COMMERCIAL

## 2017-10-17 VITALS
TEMPERATURE: 98.2 F | DIASTOLIC BLOOD PRESSURE: 81 MMHG | BODY MASS INDEX: 26.52 KG/M2 | SYSTOLIC BLOOD PRESSURE: 115 MMHG | RESPIRATION RATE: 17 BRPM | HEART RATE: 72 BPM | OXYGEN SATURATION: 98 % | WEIGHT: 145 LBS

## 2017-10-17 PROCEDURE — 99215 OFFICE O/P EST HI 40 MIN: CPT

## 2017-10-18 RX ORDER — OXYCODONE 5 MG/1
5 TABLET ORAL
Qty: 12 | Refills: 0 | Status: DISCONTINUED | COMMUNITY
Start: 2017-06-01 | End: 2017-10-18

## 2017-10-18 RX ORDER — AZITHROMYCIN 250 MG/1
250 TABLET, FILM COATED ORAL
Qty: 6 | Refills: 0 | Status: DISCONTINUED | COMMUNITY
Start: 2017-05-13 | End: 2017-10-18

## 2017-10-18 RX ORDER — METHYLPREDNISOLONE 4 MG/1
4 TABLET ORAL
Qty: 21 | Refills: 0 | Status: DISCONTINUED | COMMUNITY
Start: 2017-05-13 | End: 2017-10-18

## 2018-05-03 ENCOUNTER — APPOINTMENT (OUTPATIENT)
Dept: VASCULAR SURGERY | Facility: CLINIC | Age: 63
End: 2018-05-03
Payer: COMMERCIAL

## 2018-05-03 VITALS
TEMPERATURE: 98.1 F | HEART RATE: 66 BPM | SYSTOLIC BLOOD PRESSURE: 128 MMHG | WEIGHT: 145 LBS | DIASTOLIC BLOOD PRESSURE: 70 MMHG | BODY MASS INDEX: 26.68 KG/M2 | HEIGHT: 62 IN

## 2018-05-03 VITALS — HEART RATE: 64 BPM | SYSTOLIC BLOOD PRESSURE: 136 MMHG | DIASTOLIC BLOOD PRESSURE: 86 MMHG

## 2018-05-03 PROCEDURE — 93976 VASCULAR STUDY: CPT

## 2018-05-03 PROCEDURE — 99212 OFFICE O/P EST SF 10 MIN: CPT

## 2018-10-23 ENCOUNTER — APPOINTMENT (OUTPATIENT)
Dept: THORACIC SURGERY | Facility: CLINIC | Age: 63
End: 2018-10-23
Payer: COMMERCIAL

## 2018-10-23 VITALS
DIASTOLIC BLOOD PRESSURE: 77 MMHG | HEART RATE: 70 BPM | BODY MASS INDEX: 27.98 KG/M2 | OXYGEN SATURATION: 97 % | SYSTOLIC BLOOD PRESSURE: 124 MMHG | TEMPERATURE: 97.9 F | RESPIRATION RATE: 18 BRPM | WEIGHT: 153 LBS

## 2018-10-23 PROBLEM — E78.5 HYPERLIPIDEMIA, UNSPECIFIED: Chronic | Status: ACTIVE | Noted: 2017-05-19

## 2018-10-23 PROBLEM — I72.8 ANEURYSM OF OTHER SPECIFIED ARTERIES: Chronic | Status: ACTIVE | Noted: 2017-05-19

## 2018-10-23 PROBLEM — G47.00 INSOMNIA, UNSPECIFIED: Chronic | Status: ACTIVE | Noted: 2017-05-19

## 2018-10-23 PROBLEM — C34.90 MALIGNANT NEOPLASM OF UNSPECIFIED PART OF UNSPECIFIED BRONCHUS OR LUNG: Chronic | Status: ACTIVE | Noted: 2017-05-19

## 2018-10-23 PROBLEM — I10 ESSENTIAL (PRIMARY) HYPERTENSION: Chronic | Status: ACTIVE | Noted: 2017-05-19

## 2018-10-23 PROBLEM — E03.9 HYPOTHYROIDISM, UNSPECIFIED: Chronic | Status: ACTIVE | Noted: 2017-05-19

## 2018-10-23 PROBLEM — K76.0 FATTY (CHANGE OF) LIVER, NOT ELSEWHERE CLASSIFIED: Chronic | Status: ACTIVE | Noted: 2017-05-19

## 2018-10-23 PROCEDURE — 99213 OFFICE O/P EST LOW 20 MIN: CPT

## 2018-11-08 ENCOUNTER — APPOINTMENT (OUTPATIENT)
Dept: VASCULAR SURGERY | Facility: CLINIC | Age: 63
End: 2018-11-08

## 2018-12-06 ENCOUNTER — APPOINTMENT (OUTPATIENT)
Dept: VASCULAR SURGERY | Facility: CLINIC | Age: 63
End: 2018-12-06
Payer: COMMERCIAL

## 2018-12-06 VITALS
DIASTOLIC BLOOD PRESSURE: 64 MMHG | TEMPERATURE: 97.9 F | HEIGHT: 62 IN | BODY MASS INDEX: 27.6 KG/M2 | HEART RATE: 65 BPM | WEIGHT: 150 LBS | SYSTOLIC BLOOD PRESSURE: 98 MMHG

## 2018-12-06 VITALS — DIASTOLIC BLOOD PRESSURE: 66 MMHG | HEART RATE: 66 BPM | SYSTOLIC BLOOD PRESSURE: 100 MMHG

## 2018-12-06 PROCEDURE — 93976 VASCULAR STUDY: CPT

## 2018-12-06 PROCEDURE — 99212 OFFICE O/P EST SF 10 MIN: CPT

## 2019-06-13 ENCOUNTER — TRANSCRIPTION ENCOUNTER (OUTPATIENT)
Age: 64
End: 2019-06-13

## 2019-06-13 ENCOUNTER — APPOINTMENT (OUTPATIENT)
Dept: VASCULAR SURGERY | Facility: CLINIC | Age: 64
End: 2019-06-13
Payer: COMMERCIAL

## 2019-06-13 VITALS
BODY MASS INDEX: 27.79 KG/M2 | SYSTOLIC BLOOD PRESSURE: 107 MMHG | HEART RATE: 69 BPM | TEMPERATURE: 97.7 F | DIASTOLIC BLOOD PRESSURE: 75 MMHG | HEIGHT: 62 IN | WEIGHT: 151 LBS

## 2019-06-13 PROCEDURE — 93976 VASCULAR STUDY: CPT

## 2019-06-13 PROCEDURE — 99212 OFFICE O/P EST SF 10 MIN: CPT

## 2019-06-13 NOTE — HISTORY OF PRESENT ILLNESS
[FreeTextEntry1] : 63 year old female presents today for routine surveillance of splenic artery aneurysm s/p coil embolization in 5/2017. Today she reports feeling well with no complaints. She denies pain in her abdomen, pain in her back, or pain in her legs when walking. She denies new medical problems. \par \par PMH: lung cancer s/p right sided lobectomy, splenic artery aneurysm s/p coil embolization, former smoker\par \par Ultrasound performed today demonstrates an unchanged residual splenic artery aneurysm sac measuring 1.1 cm. \par

## 2019-06-13 NOTE — PHYSICAL EXAM
[2+] : left 2+ [Alert] : alert [Oriented to Person] : oriented to person [Oriented to Place] : oriented to place [Oriented to Time] : oriented to time [Calm] : calm [FreeTextEntry1] : Legs warm and well perfused.  [de-identified] : unlabored [de-identified] : well appearing and pleasant female, NAD [de-identified] : soft, NT

## 2019-06-13 NOTE — ASSESSMENT
[FreeTextEntry1] : 63 year old female presents today for routine surveillance of splenic artery aneurysm s/p coil embolization in 5/2017. Today she reports feeling well with no complaints. She denies pain in her abdomen, pain in her back, or pain in her legs when walking. She denies new medical problems. \par \par PMH: lung cancer s/p right sided lobectomy, splenic artery aneurysm s/p coil embolization, former smoker\par \par Ultrasound performed today demonstrates an unchanged residual splenic artery aneurysm sac measuring 1.1 cm. \par \par On exam, the abdomen is soft and non-tender. Legs are warm and well perfused with normal pulse exam. \par \par Patient doing well s/p splenic artery embolization. Follow up in one year for repeat ultrasound.

## 2019-06-13 NOTE — REVIEW OF SYSTEMS
[Negative] : Constitutional [Leg Claudication] : no intermittent leg claudication [Abdominal Pain] : no abdominal pain [Lower Ext Edema] : no lower extremity edema [Limb Pain] : no limb pain [Limb Swelling] : no limb swelling

## 2019-10-17 ENCOUNTER — APPOINTMENT (OUTPATIENT)
Dept: THORACIC SURGERY | Facility: CLINIC | Age: 64
End: 2019-10-17
Payer: COMMERCIAL

## 2019-10-17 VITALS
OXYGEN SATURATION: 96 % | TEMPERATURE: 98.3 F | SYSTOLIC BLOOD PRESSURE: 121 MMHG | DIASTOLIC BLOOD PRESSURE: 79 MMHG | WEIGHT: 156 LBS | RESPIRATION RATE: 18 BRPM | HEART RATE: 100 BPM | BODY MASS INDEX: 28.53 KG/M2

## 2019-10-17 PROCEDURE — 99213 OFFICE O/P EST LOW 20 MIN: CPT

## 2019-10-21 NOTE — PHYSICAL EXAM
[Heart Rate And Rhythm] : heart rate was normal and rhythm regular [Auscultation Breath Sounds / Voice Sounds] : lungs were clear to auscultation bilaterally [Heart Sounds] : normal S1 and S2 [Heart Sounds Gallop] : no gallops [Murmurs] : no murmurs [Heart Sounds Pericardial Friction Rub] : no pericardial rub [Examination Of The Chest] : the chest was normal in appearance [Chest Visual Inspection Thoracic Asymmetry] : no chest asymmetry [Diminished Respiratory Excursion] : normal chest expansion [Bowel Sounds] : normal bowel sounds [Abdomen Soft] : soft [Abdomen Tenderness] : non-tender [Abdomen Mass (___ Cm)] : no abdominal mass palpated [Skin Color & Pigmentation] : normal skin color and pigmentation [] : no rash [Skin Turgor] : normal skin turgor [Deep Tendon Reflexes (DTR)] : deep tendon reflexes were 2+ and symmetric [Sensation] : the sensory exam was normal to light touch and pinprick [No Focal Deficits] : no focal deficits [Oriented To Time, Place, And Person] : oriented to person, place, and time [Impaired Insight] : insight and judgment were intact [Affect] : the affect was normal

## 2019-10-21 NOTE — CONSULT LETTER
[Dear  ___] : Dear  [unfilled], [Consult Letter:] : I had the pleasure of evaluating your patient, [unfilled]. [( Thank you for referring [unfilled] for consultation for _____ )] : Thank you for referring [unfilled] for consultation for [unfilled] [Please see my note below.] : Please see my note below. [Consult Closing:] : Thank you very much for allowing me to participate in the care of this patient.  If you have any questions, please do not hesitate to contact me. [Sincerely,] : Sincerely, [DrMisael  ___] : Dr. DUFF [DrMisael ___] : Dr. DUFF [FreeTextEntry2] : Tarsha Erickson MD (Hem/Onc)\beth Majano MD (PCP)\beth Carroll MD (Vascular Sx)  [FreeTextEntry3] : Sameer Gallagher MD, MPH \par System Director of Thoracic Surgery \par Director of Comprehensive Lung and Foregut Coin \par Professor Cardiovascular & Thoracic Surgery  \par [facility] [employer] [facility] [employer] [facility]\par

## 2019-10-21 NOTE — ASSESSMENT
[FreeTextEntry1] : 63 y/o F, former smoker, w/ hx of HLD, Lung CA in 2004 and Recurrent Lung CA in 2008.\par Now 15yrs s/p Rt Thoracotomy RLLobectomy on 9/10/04. Path revealed AdenoCA, 1.2cm, pT1N0 Stg IA.\par And 11yrs s/p re-do Rt Thoracotomy, RMLobectomy on 10/24/08. Path revealed AdenoCA, 1.2cm, pT1N0 Stg IA.\par \par CT Chest on 10/11/19:\par - post-op changes\par - embolization coils in splenic artery \par - Rt renal stone and Lt 4.7 cm Lt renal cyst\par \par I have reviewed the patient's medical records and diagnostic images at time of this office consultation and have made the following recommendation:\par 1. CT scan showed no evidence of recurrence, recommended patient to return to office in 1yr w/ CT Chest w/out contrast\par \par \par Written by Zoraida Mota NP, acting as a scribe for Dr. Sameer Elmore. \par \par The documentation recorded by the scribe accurately reflects the service I personally performed and the decisions made by me. SAMEER ELMORE MD\par

## 2019-10-21 NOTE — HISTORY OF PRESENT ILLNESS
[FreeTextEntry1] : 63 y/o F, former smoker, w/ hx of HLD, Lung CA in 2004 and Recurrent Lung CA in 2008.\par Now 15yrs s/p Rt Thoracotomy RLLobectomy on 9/10/04. Path revealed AdenoCA, 1.2cm, pT1N0 Stg IA.\par And 11yrs s/p re-do Rt Thoracotomy, RMLobectomy on 10/24/08. Path revealed AdenoCA, 1.2cm, pT1N0 Stg IA.\par \par CT Chest on 10/6/17 showed post-op changes; DARWIN.\par \par CT Chest on 10/11/18:\par - post-op changes\par - DARWIN \par \par CT Chest on 10/11/19:\par - post-op changes\par - embolization coils in splenic artery \par - Rt renal stone and Lt 4.7 cm Lt renal cyst\par \par Pt presents today for follow up. Admits to throat irritation w/ dry cough. Denies SOB or CP.

## 2019-10-21 NOTE — DATA REVIEWED
[FreeTextEntry1] : CT Chest on 10/11/19:\par - post-op changes\par - embolization coils in splenic artery \par - Rt renal stone and Lt 4.7 cm Lt renal cyst

## 2020-01-18 NOTE — DISCHARGE NOTE ADULT - NS AS DC STROKE DX YN
Zofran 4 mg given IVP slowly for c/o continued nausea after having vomited recently. Head of bed elevated. no

## 2020-06-18 ENCOUNTER — APPOINTMENT (OUTPATIENT)
Dept: VASCULAR SURGERY | Facility: CLINIC | Age: 65
End: 2020-06-18

## 2020-06-29 ENCOUNTER — APPOINTMENT (OUTPATIENT)
Dept: VASCULAR SURGERY | Facility: CLINIC | Age: 65
End: 2020-06-29
Payer: COMMERCIAL

## 2020-06-29 PROCEDURE — 93976 VASCULAR STUDY: CPT

## 2020-07-10 ENCOUNTER — APPOINTMENT (OUTPATIENT)
Dept: VASCULAR SURGERY | Facility: CLINIC | Age: 65
End: 2020-07-10
Payer: COMMERCIAL

## 2020-07-10 PROCEDURE — 99447 NTRPROF PH1/NTRNET/EHR 11-20: CPT

## 2020-07-13 NOTE — HISTORY OF PRESENT ILLNESS
[Home] : at home, [unfilled] , at the time of the visit. [Medical Office: ([facility])___] : at the medical office located in  [FreeTextEntry1] : JOSEPH TONY is a 65 year old female with a h/o a splenic artery aneurysm s/p coil embolization in 5/2017. Today she reports feeling well with no complaints. She denies pain in her abdomen, pain in her back, or pain in her legs when walking. She denies new medical problems. \par \par PMH: lung cancer s/p right sided lobectomy, splenic artery aneurysm s/p coil embolization, former smoker\par  [Verbal consent obtained from patient] : the patient, [unfilled]

## 2020-07-13 NOTE — ASSESSMENT
[FreeTextEntry1] : JOSEPH TONY is a 65 year old female with a h/o a splenic artery aneurysm s/p coil embolization in 5/2017. Today she reports feeling well with no complaints. She denies pain in her abdomen, pain in her back, or pain in her legs when walking. She denies new medical problems. \par \par PMH: lung cancer s/p right sided lobectomy, splenic artery aneurysm s/p coil embolization, former smoker\par \par P/E not available due to nature of telephonic appointments. \par Repeat ultrasound demonstrates unchanged splenic artery measuring 1.0 cm s/p embolization.\par \par A/P:\par Patient remains asymptomatic with stable appearance of splenic artery aneurysm s/p coil embolization. Aneurysm has been stable for several years since intervention. Recommend 1 year follow up surveillance.

## 2020-10-22 ENCOUNTER — APPOINTMENT (OUTPATIENT)
Dept: THORACIC SURGERY | Facility: CLINIC | Age: 65
End: 2020-10-22
Payer: COMMERCIAL

## 2020-10-22 PROCEDURE — 99443: CPT

## 2020-10-26 NOTE — DATA REVIEWED
[FreeTextEntry1] : CT Chest on 10/20/2020:\par - stable post op changes w/ DARWIN\par - a 5 x 3mm non-obstructing Rt kidney calculus\par - hepatic steatosis

## 2020-10-26 NOTE — CONSULT LETTER
[Dear  ___] : Dear  [unfilled], [Consult Letter:] : I had the pleasure of evaluating your patient, [unfilled]. [( Thank you for referring [unfilled] for consultation for _____ )] : Thank you for referring [unfilled] for consultation for [unfilled] [Please see my note below.] : Please see my note below. [Consult Closing:] : Thank you very much for allowing me to participate in the care of this patient.  If you have any questions, please do not hesitate to contact me. [Sincerely,] : Sincerely, [DrMisael  ___] : Dr. DUFF [DrMisael ___] : Dr. DUFF [FreeTextEntry2] : Tarsha Erickson MD (Hem/Onc)\par Long Majano MD (PCP)\par Colt Carroll MD (Vascular Sx) \par  [FreeTextEntry3] : Sameer Gallagher MD, MPH \par System Director of Thoracic Surgery \par Director of Comprehensive Lung and Foregut [place] \par Professor Cardiovascular & Thoracic Surgery  \par [facility] [employer] [facility]\par \par [facility]\par [address]\par Oncology [address]\[place] [address]\par Tel: [phone]\par Fax: [phone]\par

## 2020-10-26 NOTE — HISTORY OF PRESENT ILLNESS
[FreeTextEntry1] : 66 y/o F, former smoker, w/ hx of HLD, Lung CA in 2004 and Recurrent Lung CA in 2008.\par Now 16 yrs s/p Rt Thoracotomy RLLobectomy on 9/10/04. Path revealed AdenoCA, 1.2cm, pT1N0 Stg IA.\par And 12 yrs s/p re-do Rt Thoracotomy, RMLobectomy on 10/24/08. Path revealed AdenoCA, 1.2cm, pT1N0 Stg IA.\par \par CT Chest on 10/11/19:\par - post-op changes\par - embolization coils in splenic artery \par - Rt renal stone and Lt 4.7 cm Lt renal cyst\par \par CT Chest on 10/20/2020:\par - stable post op changes w/ DARWIN\par - a 5 x 3mm non-obstructing Rt kidney calculus\par - hepatic steatosis\par \par Patient is here today for a follow up. Patient is doing well.

## 2020-10-26 NOTE — ASSESSMENT
[FreeTextEntry1] : 64 y/o F, former smoker, w/ hx of HLD, Lung CA in 2004 and Recurrent Lung CA in 2008.\par Now 16 yrs s/p Rt Thoracotomy RLLobectomy on 9/10/04. Path revealed AdenoCA, 1.2cm, pT1N0 Stg IA.\par And 12 yrs s/p re-do Rt Thoracotomy, RMLobectomy on 10/24/08. Path revealed AdenoCA, 1.2cm, pT1N0 Stg IA.\par \par CT Chest on 10/20/2020:\par - stable post op changes w/ DARWIN\par - a 5 x 3mm non-obstructing Rt kidney calculus\par - hepatic steatosis\par \par I have reviewed the patient's medical records and diagnostic images at time of this office consultation and have made the following recommendation:\par 1. CT scan showed no evidence of recurrence, recommended patient to return to office in 1yr with CT Chest w/o contrast.\par \par \par I have spent 25 minutes on the phone with patient explaining all of the above.\par \par I personally performed the services described in the documentation, reviewed the documentation recorded by the scribe in my presence and it accurately and completely records my words and actions.\par \par I, Zoraida Mota NP, am scribing for and the presence of STEVENSON Wells, the following sections HISTORY OF PRESENT ILLNESS, PAST MEDICAL/FAMILY/SOCIAL HISTORY; REVIEW OF SYSTEMS; VITAL SIGNS; PHYSICAL EXAM; DISPOSITION.\par \par
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
## 2022-11-17 ENCOUNTER — APPOINTMENT (OUTPATIENT)
Dept: THORACIC SURGERY | Facility: CLINIC | Age: 67
End: 2022-11-17

## 2022-11-17 DIAGNOSIS — C34.91 MALIGNANT NEOPLASM OF UNSPECIFIED PART OF RIGHT BRONCHUS OR LUNG: ICD-10-CM

## 2022-11-17 DIAGNOSIS — Z08 ENCOUNTER FOR FOLLOW-UP EXAMINATION AFTER COMPLETED TREATMENT FOR MALIGNANT NEOPLASM: ICD-10-CM

## 2022-11-17 DIAGNOSIS — Z85.118 ENCOUNTER FOR FOLLOW-UP EXAMINATION AFTER COMPLETED TREATMENT FOR MALIGNANT NEOPLASM: ICD-10-CM

## 2022-11-17 DIAGNOSIS — Z86.39 PERSONAL HISTORY OF OTHER ENDOCRINE, NUTRITIONAL AND METABOLIC DISEASE: ICD-10-CM

## 2022-11-17 DIAGNOSIS — I72.8 ANEURYSM OF OTHER SPECIFIED ARTERIES: ICD-10-CM

## 2022-11-17 PROCEDURE — 99443: CPT | Mod: 95

## 2022-11-19 NOTE — CONSULT LETTER
[FreeTextEntry2] : Tarsha Erickson MD (Hem/Onc)\par Long Majano MD (PCP)\par Colt Carroll MD (Vascular Sx) \par  [FreeTextEntry3] : Sameer Gallagher MD, MPH \par System Director of Thoracic Surgery \par Director of Comprehensive Lung and Foregut [place] \par Professor Cardiovascular & Thoracic Surgery  \par [facility] [employer] [facility]\par \par [facility]\par [address]\par Oncology [address]\[place] [address]\par Tel: [phone]\par Fax: [phone]\par

## 2022-11-19 NOTE — HISTORY OF PRESENT ILLNESS
[Home] : at home, [unfilled] , at the time of the visit. [Medical Office: ([facility])___] : at the medical office located in  [Verbal consent obtained from patient] : the patient, [unfilled] [FreeTextEntry1] : 66 y/o F, former smoker, w/ hx of HLD, Lung CA in 2004 and Recurrent Lung CA in 2008.\par Now 18 yrs s/p Rt Thoracotomy RLLobectomy on 9/10/04. Path revealed AdenoCA, 1.2cm, pT1N0 Stg IA.\par And 14 yrs s/p re-do Rt Thoracotomy, RMLobectomy on 10/24/08. Path revealed AdenoCA, 1.2cm, pT1N0 Stg IA.\par \par CT Chest on 10/20/2020:\par - stable post op changes w/ DARWIN\par - a 5 x 3mm non-obstructing Rt kidney calculus\par - hepatic steatosis\par \par Patient failed to f/u in 2021 with CT chest.\par \par CT Chest on 11/14/22 at [facility] [place]:\par - stable post-op changes w/ DARWIN\par \par Patient is followed today via Telephonic visit.\par

## 2022-11-19 NOTE — ASSESSMENT
[FreeTextEntry1] : 68 y/o F, former smoker, w/ hx of HLD, Lung CA in 2004 and Recurrent Lung CA in 2008.\par Now 19 yrs s/p Rt Thoracotomy RLLobectomy on 9/10/04. Path revealed AdenoCA, 1.2cm, pT1N0 Stg IA.\par And 14 yrs s/p re-do Rt Thoracotomy, RMLobectomy on 10/24/08. Path revealed AdenoCA, 1.2cm, pT1N0 Stg IA.\par \par CT Chest on 11/14/22 at [facility]:\par - stable post-op changes w/ DARWIN\par \par I have reviewed the patient's medical records and diagnostic images at time of this office consultation and have made the following recommendation:\par 1. CT scan showed no evidence of recurrence, recommended patient to return to office in 1yr w/ CT Chest w/o contrast.\par \par \par I, STEVENSON Wells, personally performed the evaluation and management (E/M) services for this established patient who presents today with (a) new problem(s)/exacerbation of (an) existing condition(s).  That E/M includes conducting the examination, assessing all new/exacerbated conditions, and establishing a new plan of care.  Today, my ACP, Nneka Nielsen NP was here to observe my evaluation and management services for this new problem/exacerbated condition to be followed going forward.\par
